# Patient Record
Sex: FEMALE | Race: WHITE
[De-identification: names, ages, dates, MRNs, and addresses within clinical notes are randomized per-mention and may not be internally consistent; named-entity substitution may affect disease eponyms.]

---

## 2018-06-12 ENCOUNTER — HOSPITAL ENCOUNTER (EMERGENCY)
Dept: HOSPITAL 8 - ED | Age: 33
Discharge: HOME | End: 2018-06-12
Payer: MEDICAID

## 2018-06-12 VITALS — WEIGHT: 156.53 LBS | HEIGHT: 66 IN | BODY MASS INDEX: 25.16 KG/M2

## 2018-06-12 VITALS — DIASTOLIC BLOOD PRESSURE: 76 MMHG | SYSTOLIC BLOOD PRESSURE: 124 MMHG
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
## 2018-06-12 DIAGNOSIS — S01.81XA: Primary | ICD-10-CM
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
## 2018-06-12 DIAGNOSIS — R11.0: ICD-10-CM

## 2018-06-12 PROCEDURE — 99284 EMERGENCY DEPT VISIT MOD MDM: CPT

## 2018-06-12 PROCEDURE — 12051 INTMD RPR FACE/MM 2.5 CM/<: CPT

## 2018-06-12 PROCEDURE — 70450 CT HEAD/BRAIN W/O DYE: CPT

## 2018-06-19 ENCOUNTER — HOSPITAL ENCOUNTER (EMERGENCY)
Dept: HOSPITAL 8 - ED | Age: 33
Discharge: HOME | End: 2018-06-19
Payer: MEDICAID

## 2018-06-19 VITALS — SYSTOLIC BLOOD PRESSURE: 123 MMHG | DIASTOLIC BLOOD PRESSURE: 76 MMHG

## 2018-06-19 VITALS — BODY MASS INDEX: 25.78 KG/M2 | WEIGHT: 164.24 LBS | HEIGHT: 67 IN

## 2018-06-19 DIAGNOSIS — Z48.02: Primary | ICD-10-CM

## 2018-06-19 PROCEDURE — 99281 EMR DPT VST MAYX REQ PHY/QHP: CPT

## 2018-07-16 ENCOUNTER — PATIENT OUTREACH (OUTPATIENT)
Dept: HEALTH INFORMATION MANAGEMENT | Facility: OTHER | Age: 33
End: 2018-07-16

## 2018-07-18 NOTE — PROGRESS NOTES
Outcome: Left Message    Please transfer to Patient Outreach Team at [phone] when patient returns call.    Attempt # 4/FINAL    PRIOR ATTEMPTS MADE BY [employer]